# Patient Record
Sex: FEMALE | Race: WHITE | NOT HISPANIC OR LATINO | Employment: OTHER | URBAN - METROPOLITAN AREA
[De-identification: names, ages, dates, MRNs, and addresses within clinical notes are randomized per-mention and may not be internally consistent; named-entity substitution may affect disease eponyms.]

---

## 2023-10-09 PROBLEM — G89.29 CHRONIC PAIN OF TOE OF LEFT FOOT: Status: ACTIVE | Noted: 2023-10-09

## 2023-10-09 PROBLEM — M79.674 CHRONIC PAIN OF TOE OF RIGHT FOOT: Status: ACTIVE | Noted: 2023-10-09

## 2023-10-09 PROBLEM — M79.675 CHRONIC PAIN OF TOE OF LEFT FOOT: Status: ACTIVE | Noted: 2023-10-09

## 2023-10-09 PROBLEM — C95.90 LEUKEMIA (MULTI): Status: ACTIVE | Noted: 2023-10-09

## 2023-10-09 PROBLEM — G89.29 CHRONIC PAIN OF TOE OF RIGHT FOOT: Status: ACTIVE | Noted: 2023-10-09

## 2023-10-09 RX ORDER — TRIAMCINOLONE ACETONIDE 1 MG/G
OINTMENT TOPICAL
COMMUNITY
Start: 2021-04-20

## 2023-10-09 RX ORDER — ROSUVASTATIN CALCIUM 20 MG/1
30 TABLET, COATED ORAL NIGHTLY
COMMUNITY
Start: 2021-10-06

## 2023-10-09 RX ORDER — PROCHLORPERAZINE MALEATE 10 MG
10 TABLET ORAL EVERY 8 HOURS PRN
COMMUNITY
Start: 2023-04-06

## 2023-10-10 ENCOUNTER — PROCEDURE VISIT (OUTPATIENT)
Dept: PODIATRY | Facility: CLINIC | Age: 73
End: 2023-10-10
Payer: MEDICARE

## 2023-10-10 DIAGNOSIS — M79.675 CHRONIC PAIN OF TOE OF LEFT FOOT: ICD-10-CM

## 2023-10-10 DIAGNOSIS — G89.29 CHRONIC PAIN OF TOE OF RIGHT FOOT: Primary | ICD-10-CM

## 2023-10-10 DIAGNOSIS — M79.674 CHRONIC PAIN OF TOE OF RIGHT FOOT: Primary | ICD-10-CM

## 2023-10-10 DIAGNOSIS — B35.1 ONYCHOMYCOSIS: ICD-10-CM

## 2023-10-10 DIAGNOSIS — G89.29 CHRONIC PAIN OF TOE OF LEFT FOOT: ICD-10-CM

## 2023-10-10 PROCEDURE — 11721 DEBRIDE NAIL 6 OR MORE: CPT | Performed by: PODIATRIST

## 2023-10-10 RX ORDER — NAPROXEN SODIUM 220 MG/1
81 TABLET, FILM COATED ORAL
COMMUNITY
Start: 2018-06-07

## 2023-10-10 RX ORDER — IMATINIB MESYLATE 400 MG/1
400 TABLET, FILM COATED ORAL
COMMUNITY
Start: 2023-03-08

## 2023-10-10 RX ORDER — IBUPROFEN 200 MG
950 CAPSULE ORAL
COMMUNITY
Start: 2021-10-27

## 2023-10-10 NOTE — PROGRESS NOTES
History Of Present Illness  Liliana Sosa is a 73 y.o. female presenting with painful elongated nails.     Past Medical History  She has no past medical history on file.    Surgical History  She has no past surgical history on file.     Social History  She has no history on file for tobacco use, alcohol use, and drug use.    Family History  No family history on file.     Allergies  Corticosteroids (glucocorticoids), Hydrocodone-acetaminophen, and Sulfa (sulfonamide antibiotics)    Medications  Current Outpatient Medications   Medication Sig Dispense Refill    DASATINIB ORAL Take by mouth.      NON FORMULARY Focus select eye vitamin - 2 pills/day - for macular degeneration      prochlorperazine (Compazine) 10 mg tablet Take 1 tablet (10 mg) by mouth every 8 hours if needed.      rosuvastatin (Crestor) 20 mg tablet Take 1.5 tablets (30 mg) by mouth once daily at bedtime.      triamcinolone (Kenalog) 0.1 % ointment Triamcinolone Acetonide 0.1 % External Ointment   Quantity: 80  Refills: 0        Start : 20-Apr-2021   Active       No current facility-administered medications for this visit.       Review of Systems    REVIEW OF SYSTEMS  GENERAL:  Negative for malaise, significant weight loss, fever  CARDIOVASCULAR: leg swelling   MUSCULOSKELETAL:  Negative for joint pain or swelling, back pain, and muscle pain.  SKIN:  Negative for lesions, rash, and itching  PSYCH:  Negative for sleep disturbance, mood disorder and recent psychosocial stressors  NEURO: Negative, denies any burning, tingling or numbness     Objective:   Vasc: DP and PT pulses are palpable bilateral.  CFT is less than 3 seconds bilateral.  Skin temperature is warm to cool proximal to distal bilateral.      Neuro:  Light touch is intact to the foot bilateral.  Protective sensation is intact to the foot when tested with the 5.07 SWM bilateral.  There is no clonus noted.  The hallux is downgoing bilateral.      Derm: Nails 1-5 bilateral are thickened,  elongated and crumbly with subungual debris. Skin is supple with normal texture and turgor noted.  Webspaces are clean, dry and intact bilateral.  There are no hyperkeratoses, ulcerations, verruca or other lesions noted.      Ortho: Muscle strength is 5/5 for all pedal groups tested.  Ankle joint, subtalar joint, 1st MPJ and lesser MPJ ROM is full and without pain or crepitus.  The foot type is rectus bilateral off weight bearing.  Pt has digital  deformities noted.    Assessment/Plan     Diagnoses and all orders for this visit:  Chronic pain of toe of right foot  Chronic pain of toe of left foot  Onychomycosis      Toenails are debrided in length and thickness to avoid infection and for pain relief            Mary Lou Galeana, CMA

## 2024-03-26 ENCOUNTER — PROCEDURE VISIT (OUTPATIENT)
Dept: PODIATRY | Facility: CLINIC | Age: 74
End: 2024-03-26
Payer: MEDICARE

## 2024-03-26 DIAGNOSIS — M79.674 CHRONIC PAIN OF TOE OF RIGHT FOOT: Primary | ICD-10-CM

## 2024-03-26 DIAGNOSIS — M79.675 CHRONIC PAIN OF TOE OF LEFT FOOT: ICD-10-CM

## 2024-03-26 DIAGNOSIS — G89.29 CHRONIC PAIN OF TOE OF LEFT FOOT: ICD-10-CM

## 2024-03-26 DIAGNOSIS — G89.29 CHRONIC PAIN OF TOE OF RIGHT FOOT: Primary | ICD-10-CM

## 2024-03-26 DIAGNOSIS — B35.1 ONYCHOMYCOSIS: ICD-10-CM

## 2024-03-26 PROCEDURE — 11721 DEBRIDE NAIL 6 OR MORE: CPT | Performed by: PODIATRIST

## 2024-03-26 NOTE — PROGRESS NOTES
History Of Present Illness  Liliana Sosa is a 73 y.o. female presenting with painful elongated nails.     Past Medical History  She has no past medical history on file.    Surgical History  She has no past surgical history on file.     Social History  She reports that she has never smoked. She has never used smokeless tobacco. She reports that she does not drink alcohol. No history on file for drug use.    Family History  No family history on file.     Allergies  Corticosteroids (glucocorticoids), Hydrocodone-acetaminophen, and Sulfa (sulfonamide antibiotics)    Medications  Current Outpatient Medications   Medication Sig Dispense Refill    aspirin 81 mg chewable tablet Chew 1 tablet (81 mg) once daily.      calcium citrate (Calcitrate) 200 mg (950 mg) tablet Take 950 mg by mouth once daily.      DASATINIB ORAL Take by mouth.      imatinib (Gleevec) 400 mg tablet Take 1 tablet (400 mg total) by mouth once daily.      NON FORMULARY Focus select eye vitamin - 2 pills/day - for macular degeneration      prochlorperazine (Compazine) 10 mg tablet Take 1 tablet (10 mg) by mouth every 8 hours if needed.      rosuvastatin (Crestor) 20 mg tablet Take 1.5 tablets (30 mg) by mouth once daily at bedtime.      triamcinolone (Kenalog) 0.1 % ointment Triamcinolone Acetonide 0.1 % External Ointment   Quantity: 80  Refills: 0        Start : 20-Apr-2021   Active       No current facility-administered medications for this visit.       Review of Systems    REVIEW OF SYSTEMS  GENERAL:  Negative for malaise, significant weight loss, fever  CARDIOVASCULAR: leg swelling   MUSCULOSKELETAL:  Negative for joint pain or swelling, back pain, and muscle pain.  SKIN:  Negative for lesions, rash, and itching  PSYCH:  Negative for sleep disturbance, mood disorder and recent psychosocial stressors  NEURO: Negative, denies any burning, tingling or numbness     Objective:   Vasc: DP and PT pulses are palpable bilateral.  CFT is less than 3 seconds  bilateral.  Skin temperature is warm to cool proximal to distal bilateral.      Neuro:  Light touch is intact to the foot bilateral.     Derm: Nails 1-5 bilateral are thickened, elongated and crumbly with subungual debris. Skin is supple with normal texture and turgor noted.  Webspaces are clean, dry and intact bilateral.  There are no hyperkeratoses, ulcerations, verruca or other lesions noted.      Ortho: Muscle strength is 5/5 for all pedal groups tested.  Ankle joint, subtalar joint, 1st MPJ and lesser MPJ ROM is full and without pain or crepitus.  The foot type is rectus bilateral off weight bearing.  Pt has digital  deformities noted.    Assessment/Plan     Painful nail mycosis        Toenails are debrided in length and thickness to avoid infection and for pain relief

## 2024-09-27 ENCOUNTER — PROCEDURE VISIT (OUTPATIENT)
Dept: PODIATRY | Facility: CLINIC | Age: 74
End: 2024-09-27
Payer: MEDICARE

## 2024-09-27 DIAGNOSIS — M79.675 PAIN IN TOES OF BOTH FEET: ICD-10-CM

## 2024-09-27 DIAGNOSIS — B35.1 ONYCHOMYCOSIS: Primary | ICD-10-CM

## 2024-09-27 DIAGNOSIS — M79.674 PAIN IN TOES OF BOTH FEET: ICD-10-CM

## 2024-09-27 PROCEDURE — 11721 DEBRIDE NAIL 6 OR MORE: CPT | Performed by: PODIATRIST

## 2024-09-27 NOTE — PROGRESS NOTES
History Of Present Illness  Liliana Sosa is a 74 y.o. female presenting with painful elongated nails.    PCP Genny MARION-CNP  Last visit 5/8/24     Past Medical History  She has no past medical history on file.    Surgical History  She has no past surgical history on file.     Social History  She reports that she has never smoked. She has never used smokeless tobacco. She reports that she does not drink alcohol. No history on file for drug use.    Family History  No family history on file.     Allergies  Corticosteroids (glucocorticoids), Hydrocodone-acetaminophen, and Sulfa (sulfonamide antibiotics)    Medications  Current Outpatient Medications   Medication Sig Dispense Refill    aspirin 81 mg chewable tablet Chew 1 tablet (81 mg) once daily.      calcium citrate (Calcitrate) 200 mg (950 mg) tablet Take 950 mg by mouth once daily.      DASATINIB ORAL Take by mouth.      imatinib (Gleevec) 400 mg tablet Take 1 tablet (400 mg total) by mouth once daily.      NON FORMULARY Focus select eye vitamin - 2 pills/day - for macular degeneration      prochlorperazine (Compazine) 10 mg tablet Take 1 tablet (10 mg) by mouth every 8 hours if needed.      rosuvastatin (Crestor) 20 mg tablet Take 1.5 tablets (30 mg) by mouth once daily at bedtime.      triamcinolone (Kenalog) 0.1 % ointment Triamcinolone Acetonide 0.1 % External Ointment   Quantity: 80  Refills: 0        Start : 20-Apr-2021   Active       No current facility-administered medications for this visit.       Review of Systems    REVIEW OF SYSTEMS  GENERAL:  Negative for malaise, significant weight loss, fever  CARDIOVASCULAR: leg swelling   MUSCULOSKELETAL:  Negative for joint pain or swelling, back pain, and muscle pain.  SKIN:  Negative for lesions, rash, and itching  PSYCH:  Negative for sleep disturbance, mood disorder and recent psychosocial stressors  NEURO: Negative, denies any burning, tingling or numbness     Objective:   Vasc: DP and PT pulses  are palpable bilateral.  CFT is less than 3 seconds bilateral.  Skin temperature is warm to cool proximal to distal bilateral.      Neuro:  Light touch is intact to the foot bilateral. No clonus     Derm: Nails 1-5 bilateral are thickened, elongated and crumbly with subungual debris. Skin is supple with normal texture and turgor noted.  Webspaces are clean, dry and intact bilateral.  There are no hyperkeratoses, ulcerations, verruca or other lesions noted.      Ortho: Muscle strength is 5/5 for all pedal groups tested.  Ankle joint, subtalar joint, 1st MPJ and lesser MPJ ROM is full and without pain or crepitus.  The foot type is rectus bilateral off weight bearing.  Pt has digital  deformities noted.    Assessment/Plan     Painful nail mycosis    toenails are debrided in length and thickness to avoid infection and for pain relief

## 2025-05-13 ENCOUNTER — APPOINTMENT (OUTPATIENT)
Dept: PODIATRY | Facility: CLINIC | Age: 75
End: 2025-05-13
Payer: MEDICARE

## 2025-05-13 DIAGNOSIS — M79.675 PAIN IN TOES OF BOTH FEET: ICD-10-CM

## 2025-05-13 DIAGNOSIS — B35.1 ONYCHOMYCOSIS: Primary | ICD-10-CM

## 2025-05-13 DIAGNOSIS — M79.674 PAIN IN TOES OF BOTH FEET: ICD-10-CM

## 2025-05-13 PROCEDURE — 1159F MED LIST DOCD IN RCRD: CPT | Performed by: PODIATRIST

## 2025-05-13 PROCEDURE — 11721 DEBRIDE NAIL 6 OR MORE: CPT | Performed by: PODIATRIST

## 2025-05-13 PROCEDURE — 1036F TOBACCO NON-USER: CPT | Performed by: PODIATRIST

## 2025-05-13 PROCEDURE — 1160F RVW MEDS BY RX/DR IN RCRD: CPT | Performed by: PODIATRIST

## 2025-05-13 NOTE — PROGRESS NOTES
History Of Present Illness  Liliana Sosa is a 74 y.o. female presenting with painful elongated nails.    PCP Genny MARION-CNP  Last visit 5/8/24     Past Medical History  She has no past medical history on file.    Surgical History  She has no past surgical history on file.     Social History  She reports that she has never smoked. She has never used smokeless tobacco. She reports that she does not drink alcohol. No history on file for drug use.    Family History  No family history on file.     Allergies  Corticosteroids (glucocorticoids), Hydrocodone-acetaminophen, and Sulfa (sulfonamide antibiotics)    Medications  Current Outpatient Medications   Medication Sig Dispense Refill    aspirin 81 mg chewable tablet Chew 1 tablet (81 mg) once daily.      calcium citrate (Calcitrate) 200 mg (950 mg) tablet Take 950 mg by mouth once daily.      DASATINIB ORAL Take by mouth.      imatinib (Gleevec) 400 mg tablet Take 1 tablet (400 mg total) by mouth once daily.      NON FORMULARY Focus select eye vitamin - 2 pills/day - for macular degeneration      prochlorperazine (Compazine) 10 mg tablet Take 1 tablet (10 mg) by mouth every 8 hours if needed.      rosuvastatin (Crestor) 20 mg tablet Take 1.5 tablets (30 mg) by mouth once daily at bedtime.      triamcinolone (Kenalog) 0.1 % ointment Triamcinolone Acetonide 0.1 % External Ointment   Quantity: 80  Refills: 0        Start : 20-Apr-2021   Active       No current facility-administered medications for this visit.       Review of Systems    REVIEW OF SYSTEMS  GENERAL:  Negative for malaise, significant weight loss, fever  CARDIOVASCULAR: leg swelling   MUSCULOSKELETAL:  Negative for joint pain or swelling, back pain, and muscle pain.  SKIN:  Negative for lesions, rash, and itching  PSYCH:  Negative for sleep disturbance, mood disorder and recent psychosocial stressors  NEURO: Negative, denies any burning, tingling or numbness     Objective:   Vasc: DP and PT pulses  are palpable bilateral.  CFT is less than 3 seconds bilateral.  Skin temperature is warm to cool proximal to distal bilateral.  Mild edema     Neuro:  Light touch is intact to the foot bilateral. No clonus     Derm: Nails 1-5 bilateral are thickened, elongated and crumbly with subungual debris. Skin is supple with normal texture and turgor noted.  Webspaces are clean, dry and intact bilateral.  There are no hyperkeratoses, ulcerations, verruca or other lesions noted.      Ortho: Muscle strength is 5/5 for all pedal groups tested.  Ankle joint, subtalar joint, 1st MPJ and lesser MPJ ROM is full and without pain or crepitus.  The foot type is rectus bilateral off weight bearing.  Pt has digital  deformities noted.    Assessment/Plan     Painful nail mycosis    toenails are debrided in length and thickness to avoid infection and for pain relief

## 2025-10-14 ENCOUNTER — APPOINTMENT (OUTPATIENT)
Dept: PODIATRY | Facility: CLINIC | Age: 75
End: 2025-10-14
Payer: MEDICARE